# Patient Record
Sex: FEMALE | URBAN - METROPOLITAN AREA
[De-identification: names, ages, dates, MRNs, and addresses within clinical notes are randomized per-mention and may not be internally consistent; named-entity substitution may affect disease eponyms.]

---

## 2022-03-07 ENCOUNTER — OFFICE VISIT (OUTPATIENT)
Dept: URBAN - METROPOLITAN AREA CLINIC 91 | Facility: CLINIC | Age: 67
End: 2022-03-07
Payer: MEDICARE

## 2022-03-07 DIAGNOSIS — H25.813 COMBINED FORMS OF AGE-RELATED CATARACT, BILATERAL: ICD-10-CM

## 2022-03-07 DIAGNOSIS — H53.15 VISUAL DISTORTIONS OF SHAPE AND SIZE: Primary | ICD-10-CM

## 2022-03-07 DIAGNOSIS — E11.9 TYPE 2 DIABETES MELLITUS WITHOUT COMPLICATIONS: ICD-10-CM

## 2022-03-07 PROCEDURE — 99214 OFFICE O/P EST MOD 30 MIN: CPT | Performed by: SPECIALIST

## 2022-03-07 PROCEDURE — 92134 CPTRZ OPH DX IMG PST SGM RTA: CPT | Performed by: SPECIALIST

## 2022-03-07 RX ORDER — METFORMIN HYDROCHLORIDE 850 MG/1
850 MG TABLET, FILM COATED ORAL
Refills: 0 | Status: ACTIVE
Start: 2022-03-07

## 2022-03-07 ASSESSMENT — INTRAOCULAR PRESSURE
OD: 15
OS: 15

## 2022-03-07 ASSESSMENT — VISUAL ACUITY
OD: 20/20
OS: 20/30

## 2022-03-07 NOTE — IMPRESSION/PLAN
Impression: Type 2 diabetes mellitus without complications: S59.8. Plan: DM without signs of diabetic retinopathy. Diabetic eye disease and importance of regular eye exams and tight glucose control discussed.

## 2022-03-07 NOTE — IMPRESSION/PLAN
Impression: Combined forms of age-related cataract, bilateral: H25.813. Plan: Due to the fact that cataracts are interfering with patient's daily activities left eye, cataract surgery was discussed as an option to improve patient's vision. I have discussed all risks and benefits associated with surgery. Patient understands that the need for cataract surgery is NOT urgent, and that surgery may be delayed if they wish. I discussed the different intra-ocular lens options available including standard monofocal IOL, Crystalens, Toric, ReSTOR Multifocal and Nanoflex blended vision. I have explained the option of patient proceeding with standard cataract surgery vs LenSx and astigmatism management. I stressed possible side effects such as halos and glare, need for glasses, contacts and further surgery such as laser vision correction or IOL exchange. I answered all patient's questions, and addressed any concerns patient may have. Patient wishes to schedule appointment for pre-operative exam at this time. patient would like to proceed with CE IOL OS, good candidate for LenSx. Not recommending MF due to glare.

## 2022-03-07 NOTE — IMPRESSION/PLAN
Impression: Combined forms of age-related cataract, bilateral: H25.813. Plan: Due to the fact that cataract is not affecting patient's vision in right eye, I would not recommend surgical intervention at this time. Patient was advised to consider using UVB sunglasses when outdoors. We will consider cataract surgery at later time if patient's visual function no longer meets their needs or interferes with their daily activities.

## 2022-06-07 ENCOUNTER — PRE-OPERATIVE VISIT (OUTPATIENT)
Dept: URBAN - METROPOLITAN AREA CLINIC 91 | Facility: CLINIC | Age: 67
End: 2022-06-07
Payer: MEDICARE

## 2022-06-07 DIAGNOSIS — H25.813 COMBINED FORMS OF AGE-RELATED CATARACT, BILATERAL: Primary | ICD-10-CM

## 2022-06-07 ASSESSMENT — PACHYMETRY
OS: 23.32
OD: 3.35
OS: 3.41
OD: 23.33

## 2022-07-21 ENCOUNTER — POST-OPERATIVE VISIT (OUTPATIENT)
Dept: URBAN - METROPOLITAN AREA CLINIC 91 | Facility: CLINIC | Age: 67
End: 2022-07-21
Payer: MEDICARE

## 2022-07-21 DIAGNOSIS — Z48.810 ENCOUNTER FOR SURGICAL AFTERCARE FOLLOWING SURGERY ON A SENSE ORGAN: Primary | ICD-10-CM

## 2022-07-21 PROCEDURE — 99024 POSTOP FOLLOW-UP VISIT: CPT | Performed by: SPECIALIST

## 2022-07-21 ASSESSMENT — INTRAOCULAR PRESSURE
OD: 15
OS: 19

## 2022-07-21 NOTE — IMPRESSION/PLAN
Impression:  Encounter for surgical aftercare following surgery on a sense organ  Z48.810. Excellent post op course   Post operative instructions reviewed - Condition is improving - Cataract OS. Plan: Patient is healing well s/p CE IOL. Patient instructed to avoid lifting over 10 pounds and avoid swimming for 2 weeks. Instructed to continue antibiotic, NSAID and steroidal drops QID. Patient will return in 1 week for post op exam. Instructed to call our office if any pain, decreased vision or increased redness.

## 2022-07-29 ENCOUNTER — POST-OPERATIVE VISIT (OUTPATIENT)
Dept: URBAN - METROPOLITAN AREA CLINIC 91 | Facility: CLINIC | Age: 67
End: 2022-07-29
Payer: MEDICARE

## 2022-07-29 DIAGNOSIS — Z48.810 ENCOUNTER FOR SURGICAL AFTERCARE FOLLOWING SURGERY ON A SENSE ORGAN: Primary | ICD-10-CM

## 2022-07-29 PROCEDURE — 99024 POSTOP FOLLOW-UP VISIT: CPT | Performed by: OPHTHALMOLOGY

## 2022-07-29 ASSESSMENT — INTRAOCULAR PRESSURE: OS: 18

## 2022-08-22 ENCOUNTER — OFFICE VISIT (OUTPATIENT)
Dept: URBAN - METROPOLITAN AREA CLINIC 91 | Facility: CLINIC | Age: 67
End: 2022-08-22
Payer: MEDICARE

## 2022-08-22 DIAGNOSIS — H25.813 COMBINED FORMS OF AGE-RELATED CATARACT, BILATERAL: ICD-10-CM

## 2022-08-22 DIAGNOSIS — Z48.810 ENCOUNTER FOR SURGICAL AFTERCARE FOLLOWING SURGERY ON A SENSE ORGAN: Primary | ICD-10-CM

## 2022-08-22 PROCEDURE — 99024 POSTOP FOLLOW-UP VISIT: CPT | Performed by: SPECIALIST

## 2022-08-22 ASSESSMENT — INTRAOCULAR PRESSURE
OD: 13
OS: 15

## 2022-08-22 ASSESSMENT — VISUAL ACUITY
OD: 20/20
OS: 20/25

## 2022-08-22 NOTE — IMPRESSION/PLAN
Impression: Combined forms of age-related cataract, bilateral: H25.813. Plan: Doing well s/p CEIOL OS. Patient to d/c pred. Recommend use of artificial tears. Will recheck in 6 months for DFE and glare. Due to the fact that cataract is not affecting patient's vision in OD, I would not recommend surgical intervention at this time. Patient was advised to consider using UVB sunglasses when outdoors. We will consider cataract surgery at later time if patient's visual function no longer meets their needs or interferes with their daily activities.

## 2022-08-22 NOTE — IMPRESSION/PLAN
Impression: Encounter for surgical aftercare following surgery on a sense organ: Z48.810 OS. Plan: Doing well s/p CEIOL OS. D/C pred. Use artifical tears for comfort.

## 2022-08-26 ENCOUNTER — OFFICE VISIT (OUTPATIENT)
Dept: URBAN - METROPOLITAN AREA CLINIC 91 | Facility: CLINIC | Age: 67
End: 2022-08-26
Payer: MEDICARE

## 2022-08-26 DIAGNOSIS — H00.14 CHALAZION LEFT UPPER EYELID: Primary | ICD-10-CM

## 2022-08-26 PROCEDURE — 99213 OFFICE O/P EST LOW 20 MIN: CPT | Performed by: OPHTHALMOLOGY

## 2022-08-26 RX ORDER — TOBRAMYCIN AND DEXAMETHASONE 3; 1 MG/ML; MG/ML
SUSPENSION/ DROPS OPHTHALMIC
Qty: 5 | Refills: 0 | Status: ACTIVE
Start: 2022-08-26

## 2022-08-26 RX ORDER — TOBRAMYCIN AND DEXAMETHASONE 3; 1 MG/ML; MG/ML
SUSPENSION/ DROPS OPHTHALMIC
Qty: 0 | Refills: 0 | Status: INACTIVE
Start: 2022-08-26 | End: 2022-08-26

## 2022-08-26 RX ORDER — TRIAMCINOLONE ACETONIDE 1 MG/G
0.1 % OINTMENT TOPICAL
Qty: 5 | Refills: 0 | Status: ACTIVE
Start: 2022-08-26

## 2022-08-26 NOTE — IMPRESSION/PLAN
Impression: Chalazion left upper eyelid: H00.14. Plan: The patient has acute lid swelling secondary to a chalazion. The etiology was discussed. It was recommended that the patient begin warm compresses 2-4x per day for 5-10 minutes and  lid scrubs. Tobradex gtts qid and Triamcinolone 0.1% gino OS BID were prescribed. Should medical treatment fail to resolve the chalazion then we discussed the possibility of either a kenalog injection or I & D of the lesion. It was also emphasized that chalazia can develop on any eyelid and therefore lid hygiene should be long term to minimize their occurrence.

## 2022-09-15 ENCOUNTER — OFFICE VISIT (OUTPATIENT)
Dept: URBAN - METROPOLITAN AREA CLINIC 91 | Facility: CLINIC | Age: 67
End: 2022-09-15
Payer: MEDICARE

## 2022-09-15 DIAGNOSIS — H00.014 HORDEOLUM OF LEFT UPPER EYELID: Primary | ICD-10-CM

## 2022-09-15 PROCEDURE — 99213 OFFICE O/P EST LOW 20 MIN: CPT | Performed by: SPECIALIST

## 2022-09-15 RX ORDER — ERYTHROMYCIN 5 MG/G
OINTMENT OPHTHALMIC
Qty: 3.5 | Refills: 0 | Status: ACTIVE
Start: 2022-09-15

## 2022-09-15 NOTE — IMPRESSION/PLAN
Impression: Hordeolum of left upper eyelid: H00.014. Plan: Discussed Hordeolum ALBER with patient no I&D needed at this time. Recommend to start Erythromycin gino BID OS, will continue to observe. If condition does not improve will consider drainage.

## 2023-01-10 ENCOUNTER — OFFICE VISIT (OUTPATIENT)
Dept: URBAN - METROPOLITAN AREA CLINIC 91 | Facility: CLINIC | Age: 68
End: 2023-01-10
Payer: MEDICARE

## 2023-01-10 DIAGNOSIS — H26.492 OTHER SECONDARY CATARACT, LEFT EYE: ICD-10-CM

## 2023-01-10 DIAGNOSIS — H25.811 COMBINED FORMS OF AGE-RELATED CATARACT, RIGHT EYE: Primary | ICD-10-CM

## 2023-01-10 DIAGNOSIS — H02.831 DERMATOCHALASIS OF RIGHT UPPER EYELID: ICD-10-CM

## 2023-01-10 DIAGNOSIS — H02.834 DERMATOCHALASIS OF LEFT UPPER EYELID: ICD-10-CM

## 2023-01-10 DIAGNOSIS — E11.9 TYPE 2 DIABETES MELLITUS WITHOUT COMPLICATIONS: ICD-10-CM

## 2023-01-10 PROCEDURE — 99214 OFFICE O/P EST MOD 30 MIN: CPT | Performed by: SPECIALIST

## 2023-01-10 ASSESSMENT — VISUAL ACUITY
OD: 20/20
OS: 20/25

## 2023-01-10 ASSESSMENT — INTRAOCULAR PRESSURE
OS: 15
OD: 13

## 2023-01-10 NOTE — IMPRESSION/PLAN
Impression: Type 2 diabetes mellitus without complications: U39.4. Plan: DM without signs of diabetic retinopathy. Diabetic eye disease and importance of regular eye exams and tight glucose control discussed.

## 2023-01-10 NOTE — IMPRESSION/PLAN
Impression: Combined forms of age-related cataract, right eye: H25.811. Plan: Due to the fact that cataract is not affecting patient's vision in, I would not recommend surgical intervention at this time. Patient was advised to consider using UVB sunglasses when outdoors. We will consider cataract surgery at later time if patient's visual function no longer meets their needs or interferes with their daily activities.

## 2023-04-12 ENCOUNTER — OFFICE VISIT (OUTPATIENT)
Dept: URBAN - METROPOLITAN AREA CLINIC 91 | Facility: CLINIC | Age: 68
End: 2023-04-12
Payer: MEDICARE

## 2023-04-12 DIAGNOSIS — H02.831 DERMATOCHALASIS OF RIGHT UPPER EYELID: Primary | ICD-10-CM

## 2023-04-12 DIAGNOSIS — H02.834 DERMATOCHALASIS OF LEFT UPPER EYELID: ICD-10-CM

## 2023-04-12 PROCEDURE — 99213 OFFICE O/P EST LOW 20 MIN: CPT | Performed by: SPECIALIST

## 2023-04-12 PROCEDURE — 92081 LIMITED VISUAL FIELD XM: CPT | Performed by: SPECIALIST

## 2023-04-12 PROCEDURE — 92285 EXTERNAL OCULAR PHOTOGRAPHY: CPT | Performed by: SPECIALIST

## 2023-04-12 RX ORDER — NEOMYCIN SULFATE, POLYMYXIN B SULFATE AND DEXAMETHASONE 3.5; 10000; 1 MG/G; [USP'U]/G; MG/G
OINTMENT OPHTHALMIC
Qty: 3.5 | Refills: 1 | Status: ACTIVE
Start: 2023-04-12

## 2023-04-12 RX ORDER — CEPHALEXIN 250 MG/1
250 MG CAPSULE ORAL
Qty: 28 | Refills: 0 | Status: ACTIVE
Start: 2023-04-12

## 2023-04-12 ASSESSMENT — INTRAOCULAR PRESSURE
OD: 13
OS: 13

## 2023-08-14 ENCOUNTER — POST-OPERATIVE VISIT (OUTPATIENT)
Dept: URBAN - METROPOLITAN AREA CLINIC 91 | Facility: CLINIC | Age: 68
End: 2023-08-14
Payer: MEDICARE

## 2023-08-14 DIAGNOSIS — Z48.810 ENCOUNTER FOR SURGICAL AFTERCARE FOLLOWING SURGERY ON A SENSE ORGAN: Primary | ICD-10-CM

## 2023-08-14 PROCEDURE — 99024 POSTOP FOLLOW-UP VISIT: CPT | Performed by: SPECIALIST

## 2023-08-14 ASSESSMENT — INTRAOCULAR PRESSURE
OS: 14
OD: 16

## 2024-06-13 ENCOUNTER — OFFICE VISIT (OUTPATIENT)
Dept: URBAN - METROPOLITAN AREA CLINIC 91 | Facility: CLINIC | Age: 69
End: 2024-06-13
Payer: MEDICARE

## 2024-06-13 DIAGNOSIS — H25.811 COMBINED FORMS OF AGE-RELATED CATARACT, RIGHT EYE: Primary | ICD-10-CM

## 2024-06-13 DIAGNOSIS — H00.12 CHALAZION RIGHT LOWER EYELID: ICD-10-CM

## 2024-06-13 PROCEDURE — 99213 OFFICE O/P EST LOW 20 MIN: CPT | Performed by: SPECIALIST

## 2024-06-13 RX ORDER — NEOMYCIN SULFATE, POLYMYXIN B SULFATE AND DEXAMETHASONE 3.5; 10000; 1 MG/G; [USP'U]/G; MG/G
OINTMENT OPHTHALMIC
Qty: 3.5 | Refills: 0 | Status: ACTIVE
Start: 2024-06-13

## 2024-06-13 ASSESSMENT — VISUAL ACUITY
OD: 20/25
OS: 20/25

## 2024-06-13 ASSESSMENT — INTRAOCULAR PRESSURE
OS: 15
OD: 14